# Patient Record
Sex: FEMALE | Race: WHITE | NOT HISPANIC OR LATINO | Employment: UNEMPLOYED | URBAN - METROPOLITAN AREA
[De-identification: names, ages, dates, MRNs, and addresses within clinical notes are randomized per-mention and may not be internally consistent; named-entity substitution may affect disease eponyms.]

---
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
## 2025-05-13 ENCOUNTER — HOSPITAL ENCOUNTER (EMERGENCY)
Facility: CLINIC | Age: 20
Discharge: HOME | End: 2025-05-13
Attending: STUDENT IN AN ORGANIZED HEALTH CARE EDUCATION/TRAINING PROGRAM | Admitting: STUDENT IN AN ORGANIZED HEALTH CARE EDUCATION/TRAINING PROGRAM

## 2025-05-13 VITALS
RESPIRATION RATE: 18 BRPM | HEART RATE: 70 BPM | SYSTOLIC BLOOD PRESSURE: 117 MMHG | OXYGEN SATURATION: 98 % | TEMPERATURE: 97.5 F | DIASTOLIC BLOOD PRESSURE: 67 MMHG

## 2025-05-13 DIAGNOSIS — J02.9 ACUTE PHARYNGITIS, UNSPECIFIED ETIOLOGY: ICD-10-CM

## 2025-05-13 LAB — S PYO DNA THROAT QL NAA+PROBE: NOT DETECTED

## 2025-05-13 PROCEDURE — 87651 STREP A DNA AMP PROBE: CPT | Performed by: STUDENT IN AN ORGANIZED HEALTH CARE EDUCATION/TRAINING PROGRAM

## 2025-05-13 PROCEDURE — 99283 EMERGENCY DEPT VISIT LOW MDM: CPT

## 2025-05-13 PROCEDURE — 250N000013 HC RX MED GY IP 250 OP 250 PS 637: Performed by: STUDENT IN AN ORGANIZED HEALTH CARE EDUCATION/TRAINING PROGRAM

## 2025-05-13 RX ADMIN — Medication 10 MG: at 19:20

## 2025-05-13 ASSESSMENT — COLUMBIA-SUICIDE SEVERITY RATING SCALE - C-SSRS
1. IN THE PAST MONTH, HAVE YOU WISHED YOU WERE DEAD OR WISHED YOU COULD GO TO SLEEP AND NOT WAKE UP?: NO
2. HAVE YOU ACTUALLY HAD ANY THOUGHTS OF KILLING YOURSELF IN THE PAST MONTH?: NO
6. HAVE YOU EVER DONE ANYTHING, STARTED TO DO ANYTHING, OR PREPARED TO DO ANYTHING TO END YOUR LIFE?: NO

## 2025-05-13 ASSESSMENT — ACTIVITIES OF DAILY LIVING (ADL)
ADLS_ACUITY_SCORE: 41
ADLS_ACUITY_SCORE: 41

## 2025-05-13 NOTE — ED TRIAGE NOTES
Arrives ambulatory from the community. Reports this afternoon developed jaw pain and feeling like her lymph nodes are swollen. Also reports throat pain.     Also reports she started her period today.     Denies fevers at home.

## 2025-05-13 NOTE — ED PROVIDER NOTES
Emergency Department Note      History of Present Illness     Chief Complaint   Pharyngitis      HPI   A Yael Oden is a 19 year old female presenting to the ED with pharyngitis. As per mother, patient was complaining of sore throat and sharp needle-like pain in her throat onset this morning. He pain is aggravated by swallowing. Yael also endorses diarrhea but she attributes that to being on her period. she took an Advil for menstrual cramps earlier today. Patient denies rhinorrhea, otalgia, cough or emesis.    Independent Historian   None    Review of External Notes   I reviewed Derm visit from 4/14/2025  I reviewed family medicine note from 10/14/2024 100    Past Medical History     Medical History and Problem List   Self-injurious behavior   Depression  Dysmenorrhea   Suppurative hidradenitis     Medications   History reviewed. No pertinent surgical history.     Surgical History   Tonsillectomy     Physical Exam     Patient Vitals for the past 24 hrs:   BP Temp Pulse Resp SpO2   05/13/25 1743 117/67 97.5  F (36.4  C) 70 18 98 %     Physical Exam  Vital signs and nursing notes reviewed.    General:  Alert and oriented, no acute distress. Resting on bed with mom at bedside.   Skin: Skin is warm and dry. No rash. No diaphoresis.  HEENT:   Head: Normocephalic, atraumatic. Facial features symmetric.   Eyes: Conjunctiva pink, sclera white. EOMs grossly intact.   Ears: Auricles without lesion, erythema, or edema.   Nose: Symmetric with no discharge.  Mouth and throat: Lips are moist with no lesions or edema  Neck: Normal range of motion.  Neck supple with palpable submandibular lymphadenopathy  CV:  Heart RRR. 2+ radial pulses bilaterally.   Pulm/Chest: Chest wall expansion symmetric with no increased effort of breathing. Lungs clear and equal to auscultation bilaterally.   M/S: Moves all extremities spontaneously.  Psych: Normal mood and affect. Behavior is normal.      Diagnostics     Lab Results   Labs Ordered and  Resulted from Time of ED Arrival to Time of ED Departure   GROUP A STREPTOCOCCUS PCR THROAT SWAB - Normal       Result Value    Group A strep by PCR Not Detected       Imaging   No orders to display     Independent Interpretation   None    ED Course      Medications Administered   Medications - No data to display    Procedures   Procedures     Discussion of Management   None    ED Course   ED Course as of 05/13/25 1832   Tue May 13, 2025   1831 I obtained the history and examined the patient as noted above.         Additional Documentation  Social Determinants of Health: none    Medical Decision Making / Diagnosis     CMS Diagnoses: None    MIPS   None       MDM   A Yael Oden is a 19 year old female who presents to the emergency department for 1 day of sore throat.  See HPI.  Vitals are reassuring.  On exam, she has evidence of pharyngitis and cervical lymphadenopathy.  Strep testing is negative.  I offered COVID and influenza testing, but mom declines.  We discussed possibility of mononucleosis, however mom states this is next to impossible given her lack of social risks.  Nonetheless, we, petechiae, or other posterior oropharynx lesions.  No evidence of uvulitis.  Dreviewed that testing after only 1 day of symptoms could yield false results.  Will hold off on this at this time.  Using reasonable clinical judgment, I feel the patient is safe for discharge home.  There is no evidence of herpangina, deep space neck infection, Rodney angina.  She has no trismus, facial or neck swelling, fevers, vomiting discussed supportive cares with Tylenol, ibuprofen, and a dose of Decadron that was provided here.  Return precautions reviewed, otherwise they will follow-up with her primary care provider later this week for recheck.  Patient and mom agreeable to plan and had questions answered    Disposition   The patient was discharged.     Diagnosis     ICD-10-CM    1. Acute pharyngitis, unspecified etiology  J02.9             Discharge Medications   Discharge Medication List as of 5/13/2025  7:18 PM          Scribe Disclosure:  I, Sherri Lindsey, am serving as a scribe at 6:31 PM on 5/13/2025 to document services personally performed by Helen Seth PA-C based on my observations and the provider's statements to me.     Helen Seth PA-C on 5/14/2025 at 12:14 AM       Helen Seth PA-C  05/14/25 0014